# Patient Record
Sex: MALE
[De-identification: names, ages, dates, MRNs, and addresses within clinical notes are randomized per-mention and may not be internally consistent; named-entity substitution may affect disease eponyms.]

---

## 2022-12-08 ENCOUNTER — APPOINTMENT (OUTPATIENT)
Dept: ORTHOPEDIC SURGERY | Facility: CLINIC | Age: 57
End: 2022-12-08

## 2022-12-08 DIAGNOSIS — Z78.9 OTHER SPECIFIED HEALTH STATUS: ICD-10-CM

## 2022-12-08 DIAGNOSIS — M17.12 UNILATERAL PRIMARY OSTEOARTHRITIS, LEFT KNEE: ICD-10-CM

## 2022-12-08 PROBLEM — Z00.00 ENCOUNTER FOR PREVENTIVE HEALTH EXAMINATION: Status: ACTIVE | Noted: 2022-12-08

## 2022-12-08 PROCEDURE — 99203 OFFICE O/P NEW LOW 30 MIN: CPT

## 2022-12-08 PROCEDURE — 73562 X-RAY EXAM OF KNEE 3: CPT | Mod: LT

## 2022-12-08 RX ORDER — GUAIFENESIN AND CODEINE PHOSPHATE 10; 100 MG/5ML; MG/5ML
100-10 SOLUTION ORAL
Qty: 80 | Refills: 0 | Status: COMPLETED | COMMUNITY
Start: 2022-07-26

## 2022-12-08 RX ORDER — MELOXICAM 15 MG/1
15 TABLET ORAL
Qty: 14 | Refills: 0 | Status: ACTIVE | COMMUNITY
Start: 2022-12-08 | End: 1900-01-01

## 2022-12-08 RX ORDER — AMOXICILLIN AND CLAVULANATE POTASSIUM 875; 125 MG/1; MG/1
875-125 TABLET, COATED ORAL
Qty: 20 | Refills: 0 | Status: COMPLETED | COMMUNITY
Start: 2022-07-26

## 2022-12-08 NOTE — HISTORY OF PRESENT ILLNESS
[de-identified] : Date of Injury/Onset:  NKI. Thinks it may have been from walking into the stoop walking backwards. -\par Pain: At Rest:1  /10   \par With Activity:  2/10 \par Affecting Sleep: no\par Difficulty with stairs: yes\par Difficulty getting in and out of car: yes\par Sit to stand stiffness: yes\par Mechanism of injury:  NKI\par This is not a Work Related Injury being treated under Worker's Compensation.\par This  is not   an athletic injury occurring associated with an interscholastic or organized sports team.\par Quality of symptoms: stiff, dull, sore. Limited ROM\par Improves with: no\par Worse with:  certain motions\par Previous Treatment/Imaging/Studies Since Last Visit: \par Reports Available For Review Today: \par \par denies taking pain meds

## 2022-12-08 NOTE — PHYSICAL EXAM
[5___] : hamstring 5[unfilled]/5 [Left] : left knee [AP] : anteroposterior [Lateral] : lateral [[place]] : skyline [] : no calf tenderness [FreeTextEntry8] : Pop Fossa  [FreeTextEntry9] : ACL screws in position, medial joint space narrowing, osteophytes medially [TWNoteComboBox7] : flexion 90 degrees [de-identified] : extension 0 degrees

## 2022-12-08 NOTE — DISCUSSION/SUMMARY
[de-identified] : Discussed stretching hamstrings, knee joint, and calf to manage pain. Needs to work on ROM, specifically flexion.\par Patient prescribed anti inflammatories\par Patient will go to physical therapy - script provided in office today\par Follow up prn

## 2024-10-22 ENCOUNTER — APPOINTMENT (OUTPATIENT)
Dept: ORTHOPEDIC SURGERY | Facility: CLINIC | Age: 59
End: 2024-10-22